# Patient Record
Sex: FEMALE | NOT HISPANIC OR LATINO | Employment: UNEMPLOYED | URBAN - NONMETROPOLITAN AREA
[De-identification: names, ages, dates, MRNs, and addresses within clinical notes are randomized per-mention and may not be internally consistent; named-entity substitution may affect disease eponyms.]

---

## 2017-04-19 ENCOUNTER — HOSPITAL ENCOUNTER (OUTPATIENT)
Dept: GENERAL RADIOLOGY | Facility: HOSPITAL | Age: 57
Discharge: HOME | End: 2017-04-19
Admitting: INTERNAL MEDICINE

## 2017-04-19 ENCOUNTER — OFFICE VISIT (OUTPATIENT)
Dept: FAMILY MEDICINE CLINIC | Facility: CLINIC | Age: 57
End: 2017-04-19

## 2017-04-19 ENCOUNTER — RESULTS ENCOUNTER (OUTPATIENT)
Dept: FAMILY MEDICINE CLINIC | Facility: CLINIC | Age: 57
End: 2017-04-19

## 2017-04-19 VITALS
OXYGEN SATURATION: 98 % | SYSTOLIC BLOOD PRESSURE: 95 MMHG | BODY MASS INDEX: 25.97 KG/M2 | DIASTOLIC BLOOD PRESSURE: 65 MMHG | RESPIRATION RATE: 15 BRPM | HEART RATE: 79 BPM | WEIGHT: 133 LBS | TEMPERATURE: 97.7 F

## 2017-04-19 DIAGNOSIS — E55.9 VITAMIN D DEFICIENCY: ICD-10-CM

## 2017-04-19 DIAGNOSIS — R73.01 IMPAIRED FASTING GLUCOSE: ICD-10-CM

## 2017-04-19 DIAGNOSIS — M54.50 LUMBOSACRAL PAIN: ICD-10-CM

## 2017-04-19 DIAGNOSIS — E53.8 VITAMIN B12 DEFICIENCY: ICD-10-CM

## 2017-04-19 DIAGNOSIS — E78.00 PURE HYPERCHOLESTEROLEMIA: Primary | ICD-10-CM

## 2017-04-19 DIAGNOSIS — E03.8 ADULT ONSET HYPOTHYROIDISM: ICD-10-CM

## 2017-04-19 DIAGNOSIS — E78.00 PURE HYPERCHOLESTEROLEMIA: ICD-10-CM

## 2017-04-19 PROCEDURE — 99214 OFFICE O/P EST MOD 30 MIN: CPT | Performed by: INTERNAL MEDICINE

## 2017-04-19 PROCEDURE — 72100 X-RAY EXAM L-S SPINE 2/3 VWS: CPT

## 2017-04-19 RX ORDER — PANTOPRAZOLE SODIUM 40 MG/1
TABLET, DELAYED RELEASE ORAL DAILY
COMMUNITY
Start: 2016-02-15 | End: 2017-04-19

## 2017-04-19 RX ORDER — CYCLOBENZAPRINE HCL 5 MG
TABLET ORAL
COMMUNITY
Start: 2015-07-16 | End: 2017-04-19

## 2017-04-19 NOTE — PROGRESS NOTES
Subjective   Lizeth Garza is a 56 y.o. female.     Chief Complaint   Patient presents with   • Back Pain   • Hip Pain   • Leg Pain   • Numbness       History of Present Illness   Patient is here to follow-up on the cholesterol and is due for lab work.  She is also due for labs for her sugar.  She would like to get her labwork for her thyroid at the clinic here.  She does not want to go back to [place] to see the endocrinologist due to the drive ,Patient complains of worsening lower back pain that radiates to her right hip and leg. She states that the pain is worse when she lies down.  She states at times she has numbness down her right leg.  She does not want any muscle relaxant as it did not help her  The last time 2 years ago    The following portions of the patient's history were reviewed and updated as appropriate: allergies, current medications, past family history, past medical history, past social history, past surgical history and problem list.    Review of Systems   Constitutional: Negative for appetite change, fatigue and fever.   HENT: Negative for congestion, ear discharge, ear pain, sinus pressure and sore throat.    Eyes: Negative for pain and discharge.   Respiratory: Negative for cough, chest tightness, shortness of breath and wheezing.    Cardiovascular: Negative for chest pain, palpitations and leg swelling.   Gastrointestinal: Negative for abdominal pain, blood in stool, constipation, diarrhea and nausea.   Endocrine: Negative for cold intolerance and heat intolerance.   Genitourinary: Negative for dysuria, flank pain and frequency.   Musculoskeletal: Positive for arthralgias and back pain. Negative for joint swelling.   Skin: Negative for color change.   Allergic/Immunologic: Negative for environmental allergies and food allergies.   Neurological: Positive for numbness. Negative for dizziness, weakness and headaches.   Hematological: Negative for adenopathy. Does not bruise/bleed easily.    Psychiatric/Behavioral: Negative for behavioral problems and dysphoric mood. The patient is not nervous/anxious.          Current Outpatient Prescriptions:   •  levothyroxine (SYNTHROID) 88 MCG tablet, Take 1 tablet by mouth Daily., Disp: 90 tablet, Rfl: 1    Current Facility-Administered Medications:   •  cyanocobalamin injection 1,000 mcg, 1,000 mcg, Intramuscular, Q28 Days, Cassandra Knapp MD, 1,000 mcg at 08/19/16 1449  •  cyanocobalamin injection 1,000 mcg, 1,000 mcg, Intramuscular, Q28 Days, Cassandra Knapp MD, 1,000 mcg at 07/28/16 1542  •  cyanocobalamin injection 1,000 mcg, 1,000 mcg, Intramuscular, Q28 Days, Cassandra Knapp MD, 1,000 mcg at 09/12/16 1546    Objective     Blood pressure 95/65, pulse 79, temperature 97.7 °F (36.5 °C), temperature source Oral, resp. rate 15, weight 133 lb (60.3 kg), SpO2 98 %.    Physical Exam   Constitutional: She is oriented to person, place, and time. She appears well-developed and well-nourished. No distress.   HENT:   Head: Normocephalic and atraumatic.   Right Ear: External ear normal.   Left Ear: External ear normal.   Nose: Nose normal.   Mouth/Throat: Oropharynx is clear and moist.   Eyes: Conjunctivae and EOM are normal. Pupils are equal, round, and reactive to light.   Neck: Normal range of motion. Neck supple. No thyromegaly present.   Cardiovascular: Normal rate, regular rhythm and normal heart sounds.    Pulmonary/Chest: Effort normal and breath sounds normal. No respiratory distress. She has no wheezes.   Abdominal: Soft. Bowel sounds are normal. She exhibits no distension. There is no tenderness. There is no guarding.   Musculoskeletal: Normal range of motion. She exhibits no edema.   Lymphadenopathy:     She has no cervical adenopathy.   Neurological: She is alert and oriented to person, place, and time.   No gross  motor or sensory deficits   Skin: Skin is warm. She is not diaphoretic. No erythema.   Psychiatric: She has a normal mood and affect.   Nursing note  and vitals reviewed.      Results for orders placed or performed in visit on 10/27/16   TSH   Result Value Ref Range    TSH 4.466 0.350 - 5.350 mIU/mL   T4, Free   Result Value Ref Range    Free T4 1.21 0.89 - 1.76 ng/dL   Comprehensive Metabolic Panel   Result Value Ref Range    Glucose 75 70 - 100 mg/dL    BUN 15 9 - 23 mg/dL    Creatinine 0.80 0.60 - 1.30 mg/dL    Sodium 146 132 - 146 mmol/L    Potassium 4.8 3.5 - 5.5 mmol/L    Chloride 105 99 - 109 mmol/L    CO2 31.0 20.0 - 31.0 mmol/L    Calcium 9.5 8.7 - 10.4 mg/dL    Total Protein 7.2 5.7 - 8.2 g/dL    Albumin 4.50 3.20 - 4.80 g/dL    ALT (SGPT) 23 7 - 40 U/L    AST (SGOT) 25 0 - 33 U/L    Alkaline Phosphatase 88 25 - 100 U/L    Total Bilirubin 0.4 0.3 - 1.2 mg/dL    eGFR Non African Amer 74 >60 mL/min/1.73    eGFR  African Amer 90 >60 mL/min/1.73    Globulin 2.7 gm/dL    A/G Ratio 1.7 g/dL    BUN/Creatinine Ratio 18.8 7.0 - 25.0    Anion Gap 10.0 3.0 - 11.0 mmol/L         Assessment/Plan   Lizeth was seen today for back pain, hip pain, leg pain and numbness.    Diagnoses and all orders for this visit:    Pure hypercholesterolemia  -     CBC & Differential; Future  -     Comprehensive Metabolic Panel; Future  -     Lipid Panel; Future    Impaired fasting glucose  -     Hemoglobin A1c; Future    Adult onset hypothyroidism    Lumbosacral pain  -     XR Spine Lumbar 2 or 3 View; Future  -     MRI Lumbar Spine Without Contrast; Future  -     Ambulatory Referral to Neurosurgery    Vitamin B12 deficiency  -     Vitamin B12; Future    Vitamin D deficiency  -     Vitamin D 25 Hydroxy; Future     plan:   1.  Mixed hyperlipidemia: We will obtain fasting CMP and lipid panel.  2.impaired fasting sugar: We will obtain fasting labs and hemoglobin A1c, diet and excise counseled.     3.hypothyroidism: We will continue current medication.  Obtain TSH  4.  Back pain: We will obtain x-ray MRI and refer patient to neurosurgery.  She will benefit from physical therapy , advised  when necessary NSAIDs             Cassandra Knapp MD

## 2017-05-02 ENCOUNTER — HOSPITAL ENCOUNTER (OUTPATIENT)
Dept: MRI IMAGING | Facility: HOSPITAL | Age: 57
Discharge: HOME | End: 2017-05-02
Admitting: INTERNAL MEDICINE

## 2017-05-02 DIAGNOSIS — M54.50 LUMBOSACRAL PAIN: ICD-10-CM

## 2017-05-02 PROCEDURE — 72148 MRI LUMBAR SPINE W/O DYE: CPT

## 2017-05-25 ENCOUNTER — OFFICE VISIT (OUTPATIENT)
Dept: FAMILY MEDICINE CLINIC | Facility: CLINIC | Age: 57
End: 2017-05-25

## 2017-05-25 VITALS
DIASTOLIC BLOOD PRESSURE: 43 MMHG | BODY MASS INDEX: 22.63 KG/M2 | HEART RATE: 92 BPM | SYSTOLIC BLOOD PRESSURE: 107 MMHG | RESPIRATION RATE: 16 BRPM | TEMPERATURE: 97.8 F | OXYGEN SATURATION: 100 % | WEIGHT: 136 LBS

## 2017-05-25 DIAGNOSIS — J01.01 ACUTE RECURRENT MAXILLARY SINUSITIS: ICD-10-CM

## 2017-05-25 DIAGNOSIS — J20.9 ACUTE BRONCHITIS WITH BRONCHOSPASM: ICD-10-CM

## 2017-05-25 DIAGNOSIS — H66.003 ACUTE SUPPURATIVE OTITIS MEDIA OF BOTH EARS WITHOUT SPONTANEOUS RUPTURE OF TYMPANIC MEMBRANES, RECURRENCE NOT SPECIFIED: Primary | ICD-10-CM

## 2017-05-25 PROCEDURE — 99213 OFFICE O/P EST LOW 20 MIN: CPT | Performed by: INTERNAL MEDICINE

## 2017-05-25 RX ORDER — CEFDINIR 300 MG/1
300 CAPSULE ORAL 2 TIMES DAILY
Qty: 20 CAPSULE | Refills: 0 | Status: SHIPPED | OUTPATIENT
Start: 2017-05-25 | End: 2017-06-04

## 2017-05-25 RX ORDER — METHYLPREDNISOLONE 4 MG/1
TABLET ORAL
Qty: 21 TABLET | Refills: 0 | Status: SHIPPED | OUTPATIENT
Start: 2017-05-25 | End: 2017-08-01

## 2017-06-05 ENCOUNTER — TELEPHONE (OUTPATIENT)
Dept: FAMILY MEDICINE CLINIC | Facility: CLINIC | Age: 57
End: 2017-06-05

## 2017-06-05 RX ORDER — DOXYCYCLINE 100 MG/1
100 CAPSULE ORAL 2 TIMES DAILY
Qty: 20 CAPSULE | Refills: 0 | Status: SHIPPED | OUTPATIENT
Start: 2017-06-05 | End: 2017-06-15

## 2017-06-05 NOTE — TELEPHONE ENCOUNTER
----- Message from Millie Barton sent at 6/5/2017  9:01 AM EDT -----  Contact: PATIENT  PATIENT IS REQUESTING MEDICINE TO [facility] FOR AN EAR INFECTION.

## 2017-07-27 ENCOUNTER — TELEPHONE (OUTPATIENT)
Dept: FAMILY MEDICINE CLINIC | Facility: CLINIC | Age: 57
End: 2017-07-27

## 2017-07-27 DIAGNOSIS — E03.9 ACQUIRED HYPOTHYROIDISM: ICD-10-CM

## 2017-07-27 DIAGNOSIS — E03.9 HYPOTHYROIDISM, ADULT: Primary | ICD-10-CM

## 2017-07-27 RX ORDER — LEVOTHYROXINE SODIUM 88 MCG
TABLET ORAL
Qty: 90 TABLET | Refills: 0 | OUTPATIENT
Start: 2017-07-27

## 2017-07-28 DIAGNOSIS — E03.9 ACQUIRED HYPOTHYROIDISM: ICD-10-CM

## 2017-07-28 LAB
25(OH)D3+25(OH)D2 SERPL-MCNC: 25.7 NG/ML
ALBUMIN SERPL-MCNC: 4.5 G/DL (ref 3.5–5)
ALBUMIN/GLOB SERPL: 1.8 G/DL (ref 1–2)
ALP SERPL-CCNC: 78 U/L (ref 38–126)
ALT SERPL-CCNC: 41 U/L (ref 13–69)
AST SERPL-CCNC: 30 U/L (ref 15–46)
BASOPHILS # BLD AUTO: 0.01 10*3/MM3 (ref 0–0.2)
BASOPHILS NFR BLD AUTO: 0.2 % (ref 0–2.5)
BILIRUB SERPL-MCNC: 0.9 MG/DL (ref 0.2–1.3)
BUN SERPL-MCNC: 15 MG/DL (ref 7–20)
BUN/CREAT SERPL: 18.8 (ref 7.1–23.5)
CALCIUM SERPL-MCNC: 9.8 MG/DL (ref 8.4–10.2)
CHLORIDE SERPL-SCNC: 101 MMOL/L (ref 98–107)
CHOLEST SERPL-MCNC: 187 MG/DL (ref 0–199)
CO2 SERPL-SCNC: 25 MMOL/L (ref 26–30)
CREAT SERPL-MCNC: 0.8 MG/DL (ref 0.6–1.3)
EOSINOPHIL # BLD AUTO: 0.09 10*3/MM3 (ref 0–0.7)
EOSINOPHIL NFR BLD AUTO: 1.9 % (ref 0–7)
ERYTHROCYTE [DISTWIDTH] IN BLOOD BY AUTOMATED COUNT: 13.9 % (ref 11.5–14.5)
GLOBULIN SER CALC-MCNC: 2.5 GM/DL
GLUCOSE SERPL-MCNC: 91 MG/DL (ref 74–98)
HBA1C MFR BLD: 5.8 %
HCT VFR BLD AUTO: 40.8 % (ref 37–47)
HDLC SERPL-MCNC: 62 MG/DL (ref 40–60)
HGB BLD-MCNC: 13.2 G/DL (ref 12–16)
IMM GRANULOCYTES # BLD: 0.02 10*3/MM3 (ref 0–0.06)
IMM GRANULOCYTES NFR BLD: 0.4 % (ref 0–0.6)
LDLC SERPL CALC-MCNC: 90 MG/DL (ref 0–99)
LYMPHOCYTES # BLD AUTO: 1.71 10*3/MM3 (ref 0.6–3.4)
LYMPHOCYTES NFR BLD AUTO: 35.3 % (ref 10–50)
MCH RBC QN AUTO: 28.3 PG (ref 27–31)
MCHC RBC AUTO-ENTMCNC: 32.4 G/DL (ref 30–37)
MCV RBC AUTO: 87.4 FL (ref 81–99)
MONOCYTES # BLD AUTO: 0.41 10*3/MM3 (ref 0–0.9)
MONOCYTES NFR BLD AUTO: 8.5 % (ref 0–12)
NEUTROPHILS # BLD AUTO: 2.6 10*3/MM3 (ref 2–6.9)
NEUTROPHILS NFR BLD AUTO: 53.7 % (ref 37–80)
NRBC BLD AUTO-RTO: 0 /100 WBC (ref 0–0)
PLATELET # BLD AUTO: 199 10*3/MM3 (ref 130–400)
POTASSIUM SERPL-SCNC: 4.6 MMOL/L (ref 3.5–5.1)
PROT SERPL-MCNC: 7 G/DL (ref 6.3–8.2)
RBC # BLD AUTO: 4.67 10*6/MM3 (ref 4.2–5.4)
SODIUM SERPL-SCNC: 139 MMOL/L (ref 137–145)
TRIGL SERPL-MCNC: 177 MG/DL
TSH SERPL DL<=0.005 MIU/L-ACNC: 0.29 MIU/ML (ref 0.47–4.68)
VIT B12 SERPL-MCNC: >1000 PG/ML (ref 239–931)
VLDLC SERPL CALC-MCNC: 35.4 MG/DL
WBC # BLD AUTO: 4.84 10*3/MM3 (ref 4.8–10.8)

## 2017-07-28 RX ORDER — ERGOCALCIFEROL 1.25 MG/1
50000 CAPSULE ORAL WEEKLY
Qty: 8 CAPSULE | Refills: 0 | Status: SHIPPED | OUTPATIENT
Start: 2017-07-28 | End: 2017-08-01 | Stop reason: SDUPTHER

## 2017-07-28 RX ORDER — LEVOTHYROXINE SODIUM 0.07 MG/1
75 TABLET ORAL DAILY
Qty: 30 TABLET | Refills: 0 | Status: SHIPPED | OUTPATIENT
Start: 2017-07-28 | End: 2017-08-01 | Stop reason: SDUPTHER

## 2017-08-01 ENCOUNTER — OFFICE VISIT (OUTPATIENT)
Dept: FAMILY MEDICINE CLINIC | Facility: CLINIC | Age: 57
End: 2017-08-01

## 2017-08-01 VITALS
TEMPERATURE: 98.3 F | OXYGEN SATURATION: 100 % | BODY MASS INDEX: 22.3 KG/M2 | DIASTOLIC BLOOD PRESSURE: 72 MMHG | RESPIRATION RATE: 16 BRPM | HEART RATE: 70 BPM | SYSTOLIC BLOOD PRESSURE: 112 MMHG | WEIGHT: 134 LBS

## 2017-08-01 DIAGNOSIS — E03.8 ADULT ONSET HYPOTHYROIDISM: ICD-10-CM

## 2017-08-01 DIAGNOSIS — E78.00 PURE HYPERCHOLESTEROLEMIA: Primary | ICD-10-CM

## 2017-08-01 DIAGNOSIS — R73.01 IMPAIRED FASTING GLUCOSE: ICD-10-CM

## 2017-08-01 DIAGNOSIS — M25.551 PAIN IN JOINT OF RIGHT HIP: ICD-10-CM

## 2017-08-01 DIAGNOSIS — E03.9 ACQUIRED HYPOTHYROIDISM: ICD-10-CM

## 2017-08-01 PROCEDURE — 96372 THER/PROPH/DIAG INJ SC/IM: CPT | Performed by: INTERNAL MEDICINE

## 2017-08-01 PROCEDURE — 99214 OFFICE O/P EST MOD 30 MIN: CPT | Performed by: INTERNAL MEDICINE

## 2017-08-01 RX ORDER — KETOROLAC TROMETHAMINE 30 MG/ML
30 INJECTION, SOLUTION INTRAMUSCULAR; INTRAVENOUS ONCE
Status: COMPLETED | OUTPATIENT
Start: 2017-08-01 | End: 2017-08-01

## 2017-08-01 RX ORDER — LEVOTHYROXINE SODIUM 0.07 MG/1
75 TABLET ORAL DAILY
Qty: 30 TABLET | Refills: 0 | Status: SHIPPED | OUTPATIENT
Start: 2017-08-01 | End: 2017-08-30 | Stop reason: SDUPTHER

## 2017-08-01 RX ORDER — ERGOCALCIFEROL 1.25 MG/1
50000 CAPSULE ORAL WEEKLY
Qty: 8 CAPSULE | Refills: 0 | Status: SHIPPED | OUTPATIENT
Start: 2017-08-01 | End: 2017-09-20

## 2017-08-01 RX ADMIN — KETOROLAC TROMETHAMINE 30 MG: 30 INJECTION, SOLUTION INTRAMUSCULAR; INTRAVENOUS at 09:39

## 2017-08-29 LAB — TSH SERPL DL<=0.005 MIU/L-ACNC: 1.4 MIU/ML (ref 0.47–4.68)

## 2017-08-30 DIAGNOSIS — E03.9 ACQUIRED HYPOTHYROIDISM: ICD-10-CM

## 2017-08-30 RX ORDER — LEVOTHYROXINE SODIUM 0.07 MG/1
75 TABLET ORAL DAILY
Qty: 90 TABLET | Refills: 1 | Status: SHIPPED | OUTPATIENT
Start: 2017-08-30 | End: 2017-11-14 | Stop reason: SDUPTHER

## 2017-09-01 RX ORDER — LEVOTHYROXINE SODIUM 0.07 MG/1
TABLET ORAL
Qty: 30 TABLET | Refills: 0 | Status: SHIPPED | OUTPATIENT
Start: 2017-09-01 | End: 2018-03-15 | Stop reason: SDUPTHER

## 2017-09-26 RX ORDER — ERGOCALCIFEROL 1.25 MG/1
CAPSULE ORAL
Qty: 8 CAPSULE | Refills: 0 | OUTPATIENT
Start: 2017-09-26

## 2017-10-04 ENCOUNTER — TELEPHONE (OUTPATIENT)
Dept: FAMILY MEDICINE CLINIC | Facility: CLINIC | Age: 57
End: 2017-10-04

## 2017-10-04 RX ORDER — AMOXICILLIN 500 MG/1
500 CAPSULE ORAL 3 TIMES DAILY
Qty: 30 CAPSULE | Refills: 0 | Status: SHIPPED | OUTPATIENT
Start: 2017-10-04 | End: 2017-10-14

## 2017-11-14 DIAGNOSIS — E03.9 ACQUIRED HYPOTHYROIDISM: ICD-10-CM

## 2017-11-14 RX ORDER — LEVOTHYROXINE SODIUM 0.07 MG/1
75 TABLET ORAL DAILY
Qty: 90 TABLET | Refills: 0 | Status: SHIPPED | OUTPATIENT
Start: 2017-11-14 | End: 2018-03-16 | Stop reason: SDUPTHER

## 2018-03-15 ENCOUNTER — TELEPHONE (OUTPATIENT)
Dept: INTERNAL MEDICINE | Facility: CLINIC | Age: 58
End: 2018-03-15

## 2018-03-15 ENCOUNTER — OFFICE VISIT (OUTPATIENT)
Dept: INTERNAL MEDICINE | Facility: CLINIC | Age: 58
End: 2018-03-15

## 2018-03-15 VITALS
WEIGHT: 135 LBS | DIASTOLIC BLOOD PRESSURE: 60 MMHG | HEART RATE: 108 BPM | BODY MASS INDEX: 26.5 KG/M2 | OXYGEN SATURATION: 96 % | HEIGHT: 60 IN | TEMPERATURE: 99.5 F | SYSTOLIC BLOOD PRESSURE: 102 MMHG | RESPIRATION RATE: 14 BRPM

## 2018-03-15 DIAGNOSIS — J20.8 ACUTE BRONCHITIS DUE TO OTHER SPECIFIED ORGANISMS: ICD-10-CM

## 2018-03-15 DIAGNOSIS — H66.003 ACUTE SUPPURATIVE OTITIS MEDIA OF BOTH EARS WITHOUT SPONTANEOUS RUPTURE OF TYMPANIC MEMBRANES, RECURRENCE NOT SPECIFIED: Primary | ICD-10-CM

## 2018-03-15 DIAGNOSIS — J01.01 ACUTE RECURRENT MAXILLARY SINUSITIS: ICD-10-CM

## 2018-03-15 PROCEDURE — 99213 OFFICE O/P EST LOW 20 MIN: CPT | Performed by: INTERNAL MEDICINE

## 2018-03-15 RX ORDER — CEPHALEXIN 500 MG/1
500 CAPSULE ORAL 3 TIMES DAILY
Qty: 30 CAPSULE | Refills: 0 | Status: SHIPPED | OUTPATIENT
Start: 2018-03-15 | End: 2018-03-25

## 2018-03-15 NOTE — PROGRESS NOTES
Subjective   Lizeth Garza is a 57 y.o. female.     Chief Complaint   Patient presents with   • Cough   • Chills   • Earache   • Fever       History of Present Illness   Patient is here complaining of sinus congestion, nasal discharge which started a few days ago. Patient   states now she has ear pain    which has been worsening since the past week. She tried over-the-counter medication with no improvement in symptoms ,she complains of cough with fever and chills    The following portions of the patient's history were reviewed and updated as appropriate: allergies, current medications, past family history, past medical history, past social history, past surgical history and problem list.    Review of Systems   Constitutional: Positive for chills and fever. Negative for appetite change and fatigue.   HENT: Positive for congestion and ear pain. Negative for ear discharge, sinus pressure and sore throat.    Eyes: Negative for pain and discharge.   Respiratory: Positive for cough. Negative for chest tightness, shortness of breath and wheezing.    Cardiovascular: Negative for chest pain, palpitations and leg swelling.   Gastrointestinal: Negative for abdominal pain, blood in stool, constipation, diarrhea and nausea.   Endocrine: Negative for cold intolerance and heat intolerance.   Genitourinary: Negative for dysuria, flank pain and frequency.   Musculoskeletal: Negative for back pain and joint swelling.   Skin: Negative for color change.   Allergic/Immunologic: Negative for environmental allergies and food allergies.   Neurological: Negative for dizziness, weakness, numbness and headaches.   Hematological: Negative for adenopathy. Does not bruise/bleed easily.   Psychiatric/Behavioral: Negative for behavioral problems and dysphoric mood. The patient is not nervous/anxious.          Current Outpatient Prescriptions:   •  levothyroxine (SYNTHROID, LEVOTHROID) 75 MCG tablet, Take 1 tablet by mouth Daily., Disp: 90  "tablet, Rfl: 0  •  cephalexin (KEFLEX) 500 MG capsule, Take 1 capsule by mouth 3 (Three) Times a Day for 10 days., Disp: 30 capsule, Rfl: 0    Current Facility-Administered Medications:   •  cyanocobalamin injection 1,000 mcg, 1,000 mcg, Intramuscular, Q28 Days, Cassandra Knapp MD, 1,000 mcg at 08/19/16 1449  •  cyanocobalamin injection 1,000 mcg, 1,000 mcg, Intramuscular, Q28 Days, Cassandra Knapp MD, 1,000 mcg at 07/28/16 1542  •  cyanocobalamin injection 1,000 mcg, 1,000 mcg, Intramuscular, Q28 Days, Cassandra Knapp MD, 1,000 mcg at 09/12/16 1546    Objective     Blood pressure 102/60, pulse 108, temperature 99.5 °F (37.5 °C), resp. rate 14, height 152.4 cm (60\"), weight 61.2 kg (135 lb), SpO2 96 %.    Physical Exam   Constitutional: She is oriented to person, place, and time. She appears well-developed and well-nourished. No distress.   HENT:   Head: Normocephalic and atraumatic.   Right Ear: External ear normal. Tympanic membrane is injected.   Left Ear: External ear normal. Tympanic membrane is injected.   Nose: Right sinus exhibits maxillary sinus tenderness. Left sinus exhibits maxillary sinus tenderness.   Mouth/Throat: Oropharyngeal exudate present.   Eyes: Conjunctivae and EOM are normal. Pupils are equal, round, and reactive to light.   Neck: Neck supple. No thyromegaly present.   Cardiovascular: Normal rate, regular rhythm and normal heart sounds.    Pulmonary/Chest: Effort normal and breath sounds normal. No respiratory distress.   Abdominal: Soft. Bowel sounds are normal. She exhibits no distension. There is no tenderness. There is no rebound.   Musculoskeletal: Normal range of motion. She exhibits no edema.   Lymphadenopathy:     She has no cervical adenopathy.   Neurological: She is alert and oriented to person, place, and time.   No gross motor or sensory deficits   Skin: Skin is warm. She is not diaphoretic.   Psychiatric: She has a normal mood and affect.   Nursing note and vitals " reviewed.      Results for orders placed or performed in visit on 07/27/17   TSH   Result Value Ref Range    TSH 1.400 0.470 - 4.680 mIU/mL         Assessment/Plan   Lizeth was seen today for cough, chills, earache and fever.    Diagnoses and all orders for this visit:    Acute suppurative otitis media of both ears without spontaneous rupture of tympanic membranes, recurrence not specified    Acute recurrent maxillary sinusitis    Acute bronchitis due to other specified organisms    Other orders  -     cephalexin (KEFLEX) 500 MG capsule; Take 1 capsule by mouth 3 (Three) Times a Day for 10 days.        plan:  1.  Acute bronchitis: We will start oral antibiotics  2. Acute suppurative otitis media   : start oral antibiotics  ,     3 . Acute maxillary sinusitis : We will start oral antibiotics         Cassandra Knapp MD

## 2018-03-16 DIAGNOSIS — E03.9 ACQUIRED HYPOTHYROIDISM: ICD-10-CM

## 2018-03-19 RX ORDER — LEVOTHYROXINE SODIUM 0.07 MG/1
75 TABLET ORAL DAILY
Qty: 90 TABLET | Refills: 0 | Status: SHIPPED | OUTPATIENT
Start: 2018-03-19 | End: 2018-03-30 | Stop reason: SDUPTHER

## 2018-03-29 DIAGNOSIS — E03.9 ACQUIRED HYPOTHYROIDISM: Primary | ICD-10-CM

## 2018-03-29 LAB — TSH SERPL DL<=0.005 MIU/L-ACNC: 1.06 MIU/ML (ref 0.47–4.68)

## 2018-03-30 DIAGNOSIS — E03.9 ACQUIRED HYPOTHYROIDISM: ICD-10-CM

## 2018-03-30 RX ORDER — LEVOTHYROXINE SODIUM 0.07 MG/1
75 TABLET ORAL DAILY
Qty: 90 TABLET | Refills: 2 | Status: SHIPPED | OUTPATIENT
Start: 2018-03-30

## 2018-04-02 ENCOUNTER — OFFICE VISIT (OUTPATIENT)
Dept: INTERNAL MEDICINE | Facility: CLINIC | Age: 58
End: 2018-04-02

## 2018-04-02 VITALS
HEART RATE: 75 BPM | HEIGHT: 60 IN | SYSTOLIC BLOOD PRESSURE: 107 MMHG | WEIGHT: 136 LBS | BODY MASS INDEX: 26.7 KG/M2 | RESPIRATION RATE: 16 BRPM | TEMPERATURE: 97.7 F | DIASTOLIC BLOOD PRESSURE: 48 MMHG | OXYGEN SATURATION: 100 %

## 2018-04-02 DIAGNOSIS — J30.89 CHRONIC NONSEASONAL ALLERGIC RHINITIS DUE TO POLLEN: ICD-10-CM

## 2018-04-02 DIAGNOSIS — H66.003 ACUTE SUPPURATIVE OTITIS MEDIA OF BOTH EARS WITHOUT SPONTANEOUS RUPTURE OF TYMPANIC MEMBRANES, RECURRENCE NOT SPECIFIED: ICD-10-CM

## 2018-04-02 DIAGNOSIS — R53.81 MALAISE AND FATIGUE: ICD-10-CM

## 2018-04-02 DIAGNOSIS — E55.9 VITAMIN D DEFICIENCY: ICD-10-CM

## 2018-04-02 DIAGNOSIS — R53.83 MALAISE AND FATIGUE: ICD-10-CM

## 2018-04-02 DIAGNOSIS — R10.11 RIGHT UPPER QUADRANT PAIN: ICD-10-CM

## 2018-04-02 DIAGNOSIS — E53.8 VITAMIN B12 DEFICIENCY: ICD-10-CM

## 2018-04-02 DIAGNOSIS — E03.8 ADULT ONSET HYPOTHYROIDISM: Primary | ICD-10-CM

## 2018-04-02 LAB
25(OH)D3+25(OH)D2 SERPL-MCNC: 23.6 NG/ML
ALBUMIN SERPL-MCNC: 4.7 G/DL (ref 3.5–5)
ALBUMIN/GLOB SERPL: 1.6 G/DL (ref 1–2)
ALP SERPL-CCNC: 91 U/L (ref 38–126)
ALT SERPL-CCNC: 46 U/L (ref 13–69)
AST SERPL-CCNC: 28 U/L (ref 15–46)
BASOPHILS # BLD AUTO: 0.02 10*3/MM3 (ref 0–0.2)
BASOPHILS NFR BLD AUTO: 0.3 % (ref 0–2.5)
BILIRUB SERPL-MCNC: 0.4 MG/DL (ref 0.2–1.3)
BUN SERPL-MCNC: 15 MG/DL (ref 7–20)
BUN/CREAT SERPL: 21.4 (ref 7.1–23.5)
CALCIUM SERPL-MCNC: 10.3 MG/DL (ref 8.4–10.2)
CHLORIDE SERPL-SCNC: 102 MMOL/L (ref 98–107)
CO2 SERPL-SCNC: 28 MMOL/L (ref 26–30)
CREAT SERPL-MCNC: 0.7 MG/DL (ref 0.6–1.3)
EOSINOPHIL # BLD AUTO: 0.12 10*3/MM3 (ref 0–0.7)
EOSINOPHIL NFR BLD AUTO: 1.7 % (ref 0–7)
ERYTHROCYTE [DISTWIDTH] IN BLOOD BY AUTOMATED COUNT: 13.1 % (ref 11.5–14.5)
GFR SERPLBLD CREATININE-BSD FMLA CKD-EPI: 104 ML/MIN/1.73
GFR SERPLBLD CREATININE-BSD FMLA CKD-EPI: 86 ML/MIN/1.73
GLOBULIN SER CALC-MCNC: 3 GM/DL
GLUCOSE SERPL-MCNC: 87 MG/DL (ref 74–98)
HCT VFR BLD AUTO: 42.1 % (ref 37–47)
HGB BLD-MCNC: 13.7 G/DL (ref 12–16)
IMM GRANULOCYTES # BLD: 0.01 10*3/MM3 (ref 0–0.06)
IMM GRANULOCYTES NFR BLD: 0.1 % (ref 0–0.6)
LYMPHOCYTES # BLD AUTO: 1.97 10*3/MM3 (ref 0.6–3.4)
LYMPHOCYTES NFR BLD AUTO: 28.3 % (ref 10–50)
MCH RBC QN AUTO: 28.3 PG (ref 27–31)
MCHC RBC AUTO-ENTMCNC: 32.5 G/DL (ref 30–37)
MCV RBC AUTO: 87 FL (ref 81–99)
MONOCYTES # BLD AUTO: 0.43 10*3/MM3 (ref 0–0.9)
MONOCYTES NFR BLD AUTO: 6.2 % (ref 0–12)
NEUTROPHILS # BLD AUTO: 4.42 10*3/MM3 (ref 2–6.9)
NEUTROPHILS NFR BLD AUTO: 63.4 % (ref 37–80)
NRBC BLD AUTO-RTO: 0 /100 WBC (ref 0–0)
PLATELET # BLD AUTO: 215 10*3/MM3 (ref 130–400)
POTASSIUM SERPL-SCNC: 4.1 MMOL/L (ref 3.5–5.1)
PROT SERPL-MCNC: 7.7 G/DL (ref 6.3–8.2)
RBC # BLD AUTO: 4.84 10*6/MM3 (ref 4.2–5.4)
SODIUM SERPL-SCNC: 144 MMOL/L (ref 137–145)
VIT B12 SERPL-MCNC: >1000 PG/ML (ref 239–931)
WBC # BLD AUTO: 6.97 10*3/MM3 (ref 4.8–10.8)

## 2018-04-02 PROCEDURE — 99214 OFFICE O/P EST MOD 30 MIN: CPT | Performed by: INTERNAL MEDICINE

## 2018-04-02 RX ORDER — CIPROFLOXACIN 500 MG/1
500 TABLET, FILM COATED ORAL 2 TIMES DAILY
Qty: 20 TABLET | Refills: 0 | Status: SHIPPED | OUTPATIENT
Start: 2018-04-02 | End: 2018-04-12

## 2018-04-02 RX ORDER — FLUTICASONE PROPIONATE 50 MCG
2 SPRAY, SUSPENSION (ML) NASAL DAILY
Qty: 1 BOTTLE | Refills: 6 | Status: SHIPPED | OUTPATIENT
Start: 2018-04-02

## 2018-04-02 NOTE — PROGRESS NOTES
Subjective   Lizeth Garza is a 57 y.o. female.     Chief Complaint   Patient presents with   • Earache     follow-up, patient states she is still having pain in her left ear   • sleeping issues     patient states she is sleeping more than normal and she is very tired   • Hypothyroidism   • Allergies   • Fatigue   • Abdominal Pain       History of Present Illness   Patient is here to follow-up on her thyroid.  She takes her medications as prescribed.  She complains of fatigue and she is very tired and she is sleeping a lot.  She also complains of allergy.  She complains of left ear pain which has been worsening.  She completed her keflex , She also complains of pain in the right upper quadrant which  Has been on and off with associated nausea    The following portions of the patient's history were reviewed and updated as appropriate: allergies, current medications, past family history, past medical history, past social history, past surgical history and problem list.    Review of Systems   Constitutional: Negative for appetite change, fatigue and fever.   HENT: Positive for ear pain. Negative for congestion, ear discharge, sinus pressure and sore throat.    Eyes: Negative for pain and discharge.   Respiratory: Negative for cough, chest tightness, shortness of breath and wheezing.    Cardiovascular: Negative for chest pain, palpitations and leg swelling.   Gastrointestinal: Positive for abdominal pain. Negative for blood in stool, constipation, diarrhea and nausea.        Ruq   Endocrine: Negative for cold intolerance and heat intolerance.   Genitourinary: Negative for dysuria, flank pain and frequency.   Musculoskeletal: Negative for back pain and joint swelling.   Skin: Negative for color change.   Allergic/Immunologic: Negative for environmental allergies and food allergies.   Neurological: Negative for dizziness, weakness, numbness and headaches.   Hematological: Negative for adenopathy. Does not  "bruise/bleed easily.   Psychiatric/Behavioral: Negative for behavioral problems and dysphoric mood. The patient is not nervous/anxious.          Current Outpatient Prescriptions:   •  levothyroxine (SYNTHROID, LEVOTHROID) 75 MCG tablet, Take 1 tablet by mouth Daily., Disp: 90 tablet, Rfl: 2  •  ciprofloxacin (CIPRO) 500 MG tablet, Take 1 tablet by mouth 2 (Two) Times a Day for 10 days., Disp: 20 tablet, Rfl: 0  •  fluticasone (FLONASE) 50 MCG/ACT nasal spray, 2 sprays into each nostril Daily., Disp: 1 bottle, Rfl: 6    Current Facility-Administered Medications:   •  cyanocobalamin injection 1,000 mcg, 1,000 mcg, Intramuscular, Q28 Days, Cassandra Knapp MD, 1,000 mcg at 08/19/16 1449  •  cyanocobalamin injection 1,000 mcg, 1,000 mcg, Intramuscular, Q28 Days, Cassandra Knapp MD, 1,000 mcg at 07/28/16 1542  •  cyanocobalamin injection 1,000 mcg, 1,000 mcg, Intramuscular, Q28 Days, Cassandra Knapp MD, 1,000 mcg at 09/12/16 1546    Objective     Blood pressure 107/48, pulse 75, temperature 97.7 °F (36.5 °C), temperature source Temporal Artery , resp. rate 16, height 152.4 cm (60\"), weight 61.7 kg (136 lb), SpO2 100 %.    Physical Exam   Constitutional: She is oriented to person, place, and time. She appears well-developed and well-nourished. No distress.   HENT:   Head: Normocephalic and atraumatic.   Right Ear: External ear normal.   Left Ear: External ear normal.   Nose: Nose normal.   Mouth/Throat: Oropharyngeal exudate present.   Eyes: Conjunctivae and EOM are normal. Pupils are equal, round, and reactive to light.   Neck: Neck supple. No thyromegaly present.   Cardiovascular: Normal rate, regular rhythm and normal heart sounds.    Pulmonary/Chest: Effort normal and breath sounds normal. No respiratory distress.   Abdominal: Soft. Bowel sounds are normal. She exhibits no distension. There is tenderness. There is no rebound.   ruq   Musculoskeletal: Normal range of motion. She exhibits no edema.   Lymphadenopathy:     She " has no cervical adenopathy.   Neurological: She is alert and oriented to person, place, and time.   No gross motor or sensory deficits   Skin: Skin is warm. She is not diaphoretic.   Psychiatric: She has a normal mood and affect.   Nursing note and vitals reviewed.      Results for orders placed or performed in visit on 03/29/18   TSH   Result Value Ref Range    TSH 1.060 0.470 - 4.680 mIU/mL         Assessment/Plan   Lizeth was seen today for earache, sleeping issues, hypothyroidism, allergies, fatigue and abdominal pain.    Diagnoses and all orders for this visit:    Adult onset hypothyroidism    Right upper quadrant pain  -     US Gallbladder  -     NM HIDA Scan With Pharmacological Intervention    Malaise and fatigue  -     CBC & Differential  -     Comprehensive Metabolic Panel    Chronic nonseasonal allergic rhinitis due to pollen    Acute suppurative otitis media of both ears without spontaneous rupture of tympanic membranes, recurrence not specified    Vitamin B12 deficiency  -     Vitamin B12    Vitamin D deficiency  -     Vitamin D 25 Hydroxy    Other orders  -     fluticasone (FLONASE) 50 MCG/ACT nasal spray; 2 sprays into each nostril Daily.  -     ciprofloxacin (CIPRO) 500 MG tablet; Take 1 tablet by mouth 2 (Two) Times a Day for 10 days.      Plan:  1.hypothyroidism: TSH reviewed, will continue current medication  2.right upper quadrant abdominal pain: We will obtain ultrasound and HIDA scan.  We will obtain labs  3.fatigue: We will obtain labs  4.acute suppurative otitis media: We will start oral antibiotics  5.allergic rhinitis: Will give a trial with Flonase and over-the-counter antihistamine           Cassandra Knapp MD

## 2018-04-03 ENCOUNTER — HOSPITAL ENCOUNTER (OUTPATIENT)
Dept: ULTRASOUND IMAGING | Facility: HOSPITAL | Age: 58
Discharge: HOME | End: 2018-04-03
Admitting: INTERNAL MEDICINE

## 2018-04-03 PROCEDURE — 76705 ECHO EXAM OF ABDOMEN: CPT

## 2018-04-03 RX ORDER — ERGOCALCIFEROL 1.25 MG/1
50000 CAPSULE ORAL WEEKLY
Qty: 8 CAPSULE | Refills: 0 | Status: SHIPPED | OUTPATIENT
Start: 2018-04-03 | End: 2018-10-02

## 2018-04-16 ENCOUNTER — APPOINTMENT (OUTPATIENT)
Dept: NUCLEAR MEDICINE | Facility: HOSPITAL | Age: 58
End: 2018-04-16

## 2018-08-23 ENCOUNTER — OFFICE VISIT (OUTPATIENT)
Dept: OBSTETRICS AND GYNECOLOGY | Facility: CLINIC | Age: 58
End: 2018-08-23

## 2018-08-23 VITALS
WEIGHT: 137.8 LBS | BODY MASS INDEX: 27.05 KG/M2 | HEIGHT: 60 IN | SYSTOLIC BLOOD PRESSURE: 98 MMHG | DIASTOLIC BLOOD PRESSURE: 60 MMHG

## 2018-08-23 DIAGNOSIS — N81.6 CYSTOCELE WITH RECTOCELE: ICD-10-CM

## 2018-08-23 DIAGNOSIS — N95.2 VAGINAL ATROPHY: ICD-10-CM

## 2018-08-23 DIAGNOSIS — N99.3 VAGINAL VAULT PROLAPSE AFTER HYSTERECTOMY: ICD-10-CM

## 2018-08-23 DIAGNOSIS — N81.10 CYSTOCELE WITH RECTOCELE: ICD-10-CM

## 2018-08-23 DIAGNOSIS — R10.2 PELVIC PRESSURE IN FEMALE: Primary | ICD-10-CM

## 2018-08-23 PROCEDURE — 99213 OFFICE O/P EST LOW 20 MIN: CPT | Performed by: PHYSICIAN ASSISTANT

## 2018-08-23 NOTE — PATIENT INSTRUCTIONS
Patient encouraged to begin kegels daily x 100  Advised to limit lifting or strenuous activity  Offered option of starting vaginal estrogen cream and she would like to try this  Follow up in 3 months or prn

## 2018-08-23 NOTE — PROGRESS NOTES
Subjective   Chief Complaint   Patient presents with   • Pelvic Pain     pelvic pain x 3 weeks       Lizeth Garza is a 57 y.o. year old  presenting to be seen for complaints of pelvic and vaginal pressure for about 3 weeks.  She had LAVH/BSO/A&P repair in .  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 She is occasionally constipated but not often     Past Medical History:   Diagnosis Date   • History of acute sinusitis         Current Outpatient Prescriptions:   •  fluticasone (FLONASE) 50 MCG/ACT nasal spray, 2 sprays into each nostril Daily., Disp: 1 bottle, Rfl: 6  •  levothyroxine (SYNTHROID, LEVOTHROID) 75 MCG tablet, Take 1 tablet by mouth Daily., Disp: 90 tablet, Rfl: 2  •  conjugated estrogens (PREMARIN) 0.625 MG/GM vaginal cream, Use 0.5 grams intravaginally 2 times per week to control symptoms., Disp: 30 g, Rfl: 3  •  vitamin D (ERGOCALCIFEROL) 97907 units capsule capsule, Take 1 capsule by mouth 1 (One) Time Per Week., Disp: 8 capsule, Rfl: 0    Current Facility-Administered Medications:   •  cyanocobalamin injection 1,000 mcg, 1,000 mcg, Intramuscular, Q28 Days, Cassandra Knapp MD, 1,000 mcg at 16 1449  •  cyanocobalamin injection 1,000 mcg, 1,000 mcg, Intramuscular, Q28 Days, Cassandra Knapp MD, 1,000 mcg at 16 1542  •  cyanocobalamin injection 1,000 mcg, 1,000 mcg, Intramuscular, Q28 Days, Cassandra Knapp MD, 1,000 mcg at 16 1546   Allergies   Allergen Reactions   • Amoxicillin Diarrhea   • Nsaids GI Intolerance     Stomach burning      Past Surgical History:   Procedure Laterality Date   • DILATION AND CURETTAGE, DIAGNOSTIC / THERAPEUTIC     • HYSTERECTOMY     • STRABISMUS SURGERY        Social History     Social History   • Marital status:      Spouse name:  "N/A   • Number of children: N/A   • Years of education: N/A     Occupational History   • Not on file.     Social History Main Topics   • Smoking status: Never Smoker   • Smokeless tobacco: Never Used   • Alcohol use No   • Drug use: No   • Sexual activity: Yes     Partners: Male     Birth control/ protection: Post-menopausal     Other Topics Concern   • Not on file     Social History Narrative   • No narrative on file      Family History   Problem Relation Age of Onset   • Diabetes Other    • Other Other         malaria:  AGE 75   • Heart attack Other    • Asthma Other    • Heart disease Other        Review of Systems   Constitutional: Negative.    Gastrointestinal: Positive for constipation.   Genitourinary: Positive for pelvic pain. Negative for difficulty urinating, dysuria and vaginal discharge.           Objective   BP 98/60   Ht 152.4 cm (60\")   Wt 62.5 kg (137 lb 12.8 oz)   Breastfeeding? No   BMI 26.91 kg/m²     Physical Exam   Constitutional: She appears well-developed and well-nourished. She is cooperative.   Abdominal: Soft. Normal appearance. There is no tenderness. There is no rigidity and no guarding.   Genitourinary: There is no tenderness or lesion on the right labia. There is no tenderness or lesion on the left labia. Right adnexum displays no mass and no tenderness. Left adnexum displays no mass and no tenderness. No tenderness in the vagina. No vaginal discharge found.   Genitourinary Comments: Vaginal tissue atrophic. There is 1 plus vaginal cuff prolapse and 1-2 plus cystocele and rectocele`with valsalva    Neurological: She is alert.   Skin: Skin is warm and dry.   Psychiatric: She has a normal mood and affect. Her behavior is normal.            Assessment and Plan  Lizeth was seen today for pelvic pain.    Diagnoses and all orders for this visit:    Pelvic pressure in female    Cystocele with rectocele    Vaginal vault prolapse after hysterectomy    Vaginal atrophy    Other " orders  -     conjugated estrogens (PREMARIN) 0.625 MG/GM vaginal cream; Use 0.5 grams intravaginally 2 times per week to control symptoms.      Patient Instructions   Patient encouraged to begin kegels daily x 100  Advised to limit lifting or strenuous activity  Offered option of starting vaginal estrogen cream and she would like to try this  Follow up in 3 months or prn             This note was electronically signed.    Beronica Melgar PA-C   August 23, 2018

## 2018-09-17 ENCOUNTER — OFFICE VISIT (OUTPATIENT)
Dept: INTERNAL MEDICINE | Facility: CLINIC | Age: 58
End: 2018-09-17

## 2018-09-17 VITALS
SYSTOLIC BLOOD PRESSURE: 100 MMHG | HEART RATE: 69 BPM | TEMPERATURE: 97.9 F | RESPIRATION RATE: 16 BRPM | BODY MASS INDEX: 26.85 KG/M2 | OXYGEN SATURATION: 100 % | WEIGHT: 137.5 LBS | DIASTOLIC BLOOD PRESSURE: 60 MMHG

## 2018-09-17 DIAGNOSIS — J06.0 ACUTE LARYNGOPHARYNGITIS: ICD-10-CM

## 2018-09-17 DIAGNOSIS — H66.003 ACUTE SUPPURATIVE OTITIS MEDIA OF BOTH EARS WITHOUT SPONTANEOUS RUPTURE OF TYMPANIC MEMBRANES, RECURRENCE NOT SPECIFIED: Primary | ICD-10-CM

## 2018-09-17 DIAGNOSIS — J01.01 ACUTE RECURRENT MAXILLARY SINUSITIS: ICD-10-CM

## 2018-09-17 PROCEDURE — 99213 OFFICE O/P EST LOW 20 MIN: CPT | Performed by: INTERNAL MEDICINE

## 2018-09-17 RX ORDER — AZITHROMYCIN 250 MG/1
TABLET, FILM COATED ORAL
Qty: 6 TABLET | Refills: 0 | Status: SHIPPED | OUTPATIENT
Start: 2018-09-17 | End: 2018-10-02

## 2018-09-17 NOTE — PROGRESS NOTES
Subjective   Lizeth Garza is a 57 y.o. female.     Chief Complaint   Patient presents with   • Hoarse   • Sore Throat   • Earache   • Nasal Congestion   • Cough       History of Present Illness   HPI: The patient   complains of ear pain , nasal discharge , sore throat  And sinus congestion since the past few days, patient has been trying over the counter medications with not much relief in her symptoms , patient also complains of cough and now has hoarseness of voice , she even tried saline gargles and it is not  Helping her      The following portions of the patient's history were reviewed and updated as appropriate: allergies, current medications, past family history, past medical history, past social history, past surgical history and problem list.    Review of Systems   Constitutional: Negative for appetite change, fatigue and fever.   HENT: Positive for congestion, ear pain, sore throat and voice change. Negative for ear discharge and sinus pressure.    Eyes: Negative for pain and discharge.   Respiratory: Positive for cough. Negative for chest tightness, shortness of breath and wheezing.    Cardiovascular: Negative for chest pain, palpitations and leg swelling.   Gastrointestinal: Negative for abdominal pain, blood in stool, constipation, diarrhea and nausea.   Endocrine: Negative for cold intolerance and heat intolerance.   Genitourinary: Negative for dysuria, flank pain and frequency.   Musculoskeletal: Negative for back pain and joint swelling.   Skin: Negative for color change.   Allergic/Immunologic: Negative for environmental allergies and food allergies.   Neurological: Negative for dizziness, weakness, numbness and headaches.   Hematological: Negative for adenopathy. Does not bruise/bleed easily.   Psychiatric/Behavioral: Negative for behavioral problems and dysphoric mood. The patient is not nervous/anxious.          Current Outpatient Prescriptions:   •  conjugated estrogens (PREMARIN) 0.625  MG/GM vaginal cream, Use 0.5 grams intravaginally 2 times per week to control symptoms., Disp: 30 g, Rfl: 3  •  fluticasone (FLONASE) 50 MCG/ACT nasal spray, 2 sprays into each nostril Daily., Disp: 1 bottle, Rfl: 6  •  levothyroxine (SYNTHROID, LEVOTHROID) 75 MCG tablet, Take 1 tablet by mouth Daily., Disp: 90 tablet, Rfl: 2  •  azithromycin (ZITHROMAX Z-BHARGAVI) 250 MG tablet, Take 2 tablets the first day, then 1 tablet daily for 4 days., Disp: 6 tablet, Rfl: 0  •  vitamin D (ERGOCALCIFEROL) 09069 units capsule capsule, Take 1 capsule by mouth 1 (One) Time Per Week., Disp: 8 capsule, Rfl: 0    Current Facility-Administered Medications:   •  cyanocobalamin injection 1,000 mcg, 1,000 mcg, Intramuscular, Q28 Days, Cassandra Knapp MD, 1,000 mcg at 08/19/16 1449  •  cyanocobalamin injection 1,000 mcg, 1,000 mcg, Intramuscular, Q28 Days, Cassandra Knapp MD, 1,000 mcg at 07/28/16 1542  •  cyanocobalamin injection 1,000 mcg, 1,000 mcg, Intramuscular, Q28 Days, Cassandra Knapp MD, 1,000 mcg at 09/12/16 1546    Objective     Blood pressure 100/60, pulse 69, temperature 97.9 °F (36.6 °C), temperature source Temporal Artery , resp. rate 16, weight 62.4 kg (137 lb 8 oz), SpO2 100 %, not currently breastfeeding.    Physical Exam   Constitutional: She is oriented to person, place, and time. She appears well-developed and well-nourished.   HENT:   Head: Normocephalic and atraumatic.   Right Ear: Tympanic membrane is erythematous.   Left Ear: Tympanic membrane is erythematous.   Nose: Rhinorrhea present. Right sinus exhibits maxillary sinus tenderness. Left sinus exhibits maxillary sinus tenderness.   Mouth/Throat: Oropharyngeal exudate present.   Eyes: Pupils are equal, round, and reactive to light. Conjunctivae and EOM are normal.   Neck: Normal range of motion. Neck supple.   Cardiovascular: Normal rate, regular rhythm and normal heart sounds.    Pulmonary/Chest: Effort normal and breath sounds normal.   Abdominal: Soft. Bowel sounds  are normal.   Musculoskeletal: Normal range of motion. She exhibits no edema, tenderness or deformity.   Neurological: She is alert and oriented to person, place, and time.   Skin: Skin is warm and dry.   Psychiatric: She has a normal mood and affect.       Results for orders placed or performed in visit on 04/02/18   Comprehensive Metabolic Panel   Result Value Ref Range    Glucose 87 74 - 98 mg/dL    BUN 15 7 - 20 mg/dL    Creatinine 0.70 0.60 - 1.30 mg/dL    eGFR Non African Am 86 >60 mL/min/1.73    eGFR African Am 104 >60 mL/min/1.73    BUN/Creatinine Ratio 21.4 7.1 - 23.5    Sodium 144 137 - 145 mmol/L    Potassium 4.1 3.5 - 5.1 mmol/L    Chloride 102 98 - 107 mmol/L    Total CO2 28.0 26.0 - 30.0 mmol/L    Calcium 10.3 (H) 8.4 - 10.2 mg/dL    Total Protein 7.7 6.3 - 8.2 g/dL    Albumin 4.70 3.50 - 5.00 g/dL    Globulin 3.0 gm/dL    A/G Ratio 1.6 1.0 - 2.0 g/dL    Total Bilirubin 0.4 0.2 - 1.3 mg/dL    Alkaline Phosphatase 91 38 - 126 U/L    AST (SGOT) 28 15 - 46 U/L    ALT (SGPT) 46 13 - 69 U/L   Vitamin B12   Result Value Ref Range    Vitamin B-12 >1000 (H) 239 - 931 pg/mL   Vitamin D 25 Hydroxy   Result Value Ref Range    25 Hydroxy, Vitamin D 23.6 ng/ml   CBC & Differential   Result Value Ref Range    WBC 6.97 4.80 - 10.80 10*3/mm3    RBC 4.84 4.20 - 5.40 10*6/mm3    Hemoglobin 13.7 12.0 - 16.0 g/dL    Hematocrit 42.1 37.0 - 47.0 %    MCV 87.0 81.0 - 99.0 fL    MCH 28.3 27.0 - 31.0 pg    MCHC 32.5 30.0 - 37.0 g/dL    RDW 13.1 11.5 - 14.5 %    Platelets 215 130 - 400 10*3/mm3    Neutrophil Rel % 63.4 37.0 - 80.0 %    Lymphocyte Rel % 28.3 10.0 - 50.0 %    Monocyte Rel % 6.2 0.0 - 12.0 %    Eosinophil Rel % 1.7 0.0 - 7.0 %    Basophil Rel % 0.3 0.0 - 2.5 %    Neutrophils Absolute 4.42 2.00 - 6.90 10*3/mm3    Lymphocytes Absolute 1.97 0.60 - 3.40 10*3/mm3    Monocytes Absolute 0.43 0.00 - 0.90 10*3/mm3    Eosinophils Absolute 0.12 0.00 - 0.70 10*3/mm3    Basophils Absolute 0.02 0.00 - 0.20 10*3/mm3    Immature  Granulocyte Rel % 0.1 0.0 - 0.6 %    Immature Grans Absolute 0.01 0.00 - 0.06 10*3/mm3    nRBC 0.0 0.0 - 0.0 /100 WBC         Assessment/Plan   Lizeth was seen today for hoarse, sore throat, earache, nasal congestion and cough.    Diagnoses and all orders for this visit:    Acute suppurative otitis media of both ears without spontaneous rupture of tympanic membranes, recurrence not specified    Acute recurrent maxillary sinusitis    Acute laryngopharyngitis    Other orders  -     azithromycin (ZITHROMAX Z-BHARGAVI) 250 MG tablet; Take 2 tablets the first day, then 1 tablet daily for 4 days.       Plan :   1. Acute suppurative otitis media: will   start oral antibiotics    2 .Acute maxillary sinusitis:   will   start oral antibiotics   3. Acute laryngopharyngitis:  will   Start  oral antibiotics             Cassandra Knapp MD

## 2018-09-27 ENCOUNTER — TELEPHONE (OUTPATIENT)
Dept: INTERNAL MEDICINE | Facility: CLINIC | Age: 58
End: 2018-09-27

## 2018-09-28 DIAGNOSIS — E03.9 HYPOTHYROIDISM, UNSPECIFIED TYPE: ICD-10-CM

## 2018-09-28 DIAGNOSIS — R73.01 IMPAIRED FASTING GLUCOSE: ICD-10-CM

## 2018-09-28 DIAGNOSIS — E78.2 MULTIPLE-TYPE HYPERLIPIDEMIA: Primary | ICD-10-CM

## 2018-09-28 DIAGNOSIS — E55.9 VITAMIN D DEFICIENCY: ICD-10-CM

## 2018-09-28 DIAGNOSIS — E53.8 COBALAMIN DEFICIENCY: ICD-10-CM

## 2018-10-01 LAB
25(OH)D3+25(OH)D2 SERPL-MCNC: 32.4 NG/ML
ALBUMIN SERPL-MCNC: 4.3 G/DL (ref 3.5–5)
ALBUMIN/GLOB SERPL: 1.6 G/DL (ref 1–2)
ALP SERPL-CCNC: 75 U/L (ref 38–126)
ALT SERPL-CCNC: 45 U/L (ref 13–69)
AST SERPL-CCNC: 36 U/L (ref 15–46)
BASOPHILS # BLD AUTO: 0.02 10*3/MM3 (ref 0–0.2)
BASOPHILS NFR BLD AUTO: 0.5 % (ref 0–2.5)
BILIRUB SERPL-MCNC: 0.8 MG/DL (ref 0.2–1.3)
BUN SERPL-MCNC: 13 MG/DL (ref 7–20)
BUN/CREAT SERPL: 18.6 (ref 7.1–23.5)
CALCIUM SERPL-MCNC: 9.8 MG/DL (ref 8.4–10.2)
CHLORIDE SERPL-SCNC: 106 MMOL/L (ref 98–107)
CHOLEST SERPL-MCNC: 208 MG/DL (ref 0–199)
CO2 SERPL-SCNC: 27 MMOL/L (ref 26–30)
CREAT SERPL-MCNC: 0.7 MG/DL (ref 0.6–1.3)
EOSINOPHIL # BLD AUTO: 0.17 10*3/MM3 (ref 0–0.7)
EOSINOPHIL NFR BLD AUTO: 4 % (ref 0–7)
ERYTHROCYTE [DISTWIDTH] IN BLOOD BY AUTOMATED COUNT: 13.5 % (ref 11.5–14.5)
GLOBULIN SER CALC-MCNC: 2.7 GM/DL
GLUCOSE SERPL-MCNC: 98 MG/DL (ref 74–98)
HBA1C MFR BLD: 6.3 %
HCT VFR BLD AUTO: 39 % (ref 37–47)
HDLC SERPL-MCNC: 73 MG/DL (ref 40–60)
HGB BLD-MCNC: 12.4 G/DL (ref 12–16)
IMM GRANULOCYTES # BLD: 0.01 10*3/MM3 (ref 0–0.06)
IMM GRANULOCYTES NFR BLD: 0.2 % (ref 0–0.6)
LDLC SERPL CALC-MCNC: 111 MG/DL (ref 0–99)
LYMPHOCYTES # BLD AUTO: 1.58 10*3/MM3 (ref 0.6–3.4)
LYMPHOCYTES NFR BLD AUTO: 37.4 % (ref 10–50)
MCH RBC QN AUTO: 27.5 PG (ref 27–31)
MCHC RBC AUTO-ENTMCNC: 31.8 G/DL (ref 30–37)
MCV RBC AUTO: 86.5 FL (ref 81–99)
MONOCYTES # BLD AUTO: 0.33 10*3/MM3 (ref 0–0.9)
MONOCYTES NFR BLD AUTO: 7.8 % (ref 0–12)
NEUTROPHILS # BLD AUTO: 2.12 10*3/MM3 (ref 2–6.9)
NEUTROPHILS NFR BLD AUTO: 50.1 % (ref 37–80)
NRBC BLD AUTO-RTO: 0 /100 WBC (ref 0–0)
PLATELET # BLD AUTO: 217 10*3/MM3 (ref 130–400)
POTASSIUM SERPL-SCNC: 4 MMOL/L (ref 3.5–5.1)
PROT SERPL-MCNC: 7 G/DL (ref 6.3–8.2)
RBC # BLD AUTO: 4.51 10*6/MM3 (ref 4.2–5.4)
SODIUM SERPL-SCNC: 140 MMOL/L (ref 137–145)
TRIGL SERPL-MCNC: 121 MG/DL
TSH SERPL DL<=0.005 MIU/L-ACNC: 0.56 MIU/ML (ref 0.47–4.68)
VIT B12 SERPL-MCNC: 777 PG/ML (ref 239–931)
VLDLC SERPL CALC-MCNC: 24.2 MG/DL
WBC # BLD AUTO: 4.23 10*3/MM3 (ref 4.8–10.8)

## 2018-10-02 ENCOUNTER — OFFICE VISIT (OUTPATIENT)
Dept: INTERNAL MEDICINE | Facility: CLINIC | Age: 58
End: 2018-10-02

## 2018-10-02 VITALS
SYSTOLIC BLOOD PRESSURE: 108 MMHG | TEMPERATURE: 98 F | OXYGEN SATURATION: 100 % | RESPIRATION RATE: 16 BRPM | DIASTOLIC BLOOD PRESSURE: 60 MMHG | WEIGHT: 137.75 LBS | BODY MASS INDEX: 26.9 KG/M2 | HEART RATE: 71 BPM

## 2018-10-02 DIAGNOSIS — R73.01 IMPAIRED FASTING GLUCOSE: ICD-10-CM

## 2018-10-02 DIAGNOSIS — R49.0 HOARSENESS: ICD-10-CM

## 2018-10-02 DIAGNOSIS — E03.8 ADULT ONSET HYPOTHYROIDISM: ICD-10-CM

## 2018-10-02 DIAGNOSIS — E78.2 MIXED HYPERLIPIDEMIA: Primary | ICD-10-CM

## 2018-10-02 PROCEDURE — 99214 OFFICE O/P EST MOD 30 MIN: CPT | Performed by: INTERNAL MEDICINE

## 2018-10-02 RX ORDER — FEXOFENADINE HYDROCHLORIDE 60 MG/1
60 TABLET, FILM COATED ORAL DAILY
COMMUNITY

## 2018-10-02 RX ORDER — OMEPRAZOLE 20 MG/1
20 CAPSULE, DELAYED RELEASE ORAL DAILY
COMMUNITY

## 2018-10-02 NOTE — PROGRESS NOTES
Subjective   Lizeth Garza is a 57 y.o. female.     Chief Complaint   Patient presents with   • Hypothyroidism   • Hyperlipidemia   • Blood Sugar Problem   • Hoarse       History of Present Illness   HPI:  . The patient is  here to follow up on the cholesterol and sugar  is trying to follow a diet. The patient is    Also here to follow up on thyroid and had lab work done .  She complains of hoarseness of voice for more than 6 months intermittently and worsening in the past month .   Hyperlipidemia   Pertinent negatives include no chest pain or shortness of breath.   Hypertension   Pertinent negatives include no chest pain, palpitations or shortness of breath.      The following portions of the patient's history were reviewed and updated as appropriate: allergies, current medications, past family history, past medical history, past social history, past surgical history and problem list.    Review of Systems   Constitutional: Negative for appetite change, fatigue and fever.   HENT: Positive for voice change. Negative for congestion, ear discharge, ear pain, sinus pressure and sore throat.    Eyes: Negative for pain and discharge.   Respiratory: Negative for cough, chest tightness, shortness of breath and wheezing.    Cardiovascular: Negative for chest pain, palpitations and leg swelling.   Gastrointestinal: Negative for abdominal pain, blood in stool, constipation, diarrhea and nausea.   Endocrine: Negative for cold intolerance and heat intolerance.   Genitourinary: Negative for dysuria, flank pain and frequency.   Musculoskeletal: Negative for back pain and joint swelling.   Skin: Negative for color change.   Allergic/Immunologic: Negative for environmental allergies and food allergies.   Neurological: Negative for dizziness, weakness, numbness and headaches.   Hematological: Negative for adenopathy. Does not bruise/bleed easily.   Psychiatric/Behavioral: Negative for behavioral problems and dysphoric mood. The  patient is not nervous/anxious.          Current Outpatient Prescriptions:   •  conjugated estrogens (PREMARIN) 0.625 MG/GM vaginal cream, Use 0.5 grams intravaginally 2 times per week to control symptoms., Disp: 30 g, Rfl: 3  •  fexofenadine (ALLEGRA) 60 MG tablet, Take 60 mg by mouth Daily., Disp: , Rfl:   •  fluticasone (FLONASE) 50 MCG/ACT nasal spray, 2 sprays into each nostril Daily., Disp: 1 bottle, Rfl: 6  •  levothyroxine (SYNTHROID, LEVOTHROID) 75 MCG tablet, Take 1 tablet by mouth Daily., Disp: 90 tablet, Rfl: 2  •  omeprazole (priLOSEC) 20 MG capsule, Take 20 mg by mouth Daily., Disp: , Rfl:     Current Facility-Administered Medications:   •  cyanocobalamin injection 1,000 mcg, 1,000 mcg, Intramuscular, Q28 Days, Cassandra Knapp MD, 1,000 mcg at 08/19/16 1449  •  cyanocobalamin injection 1,000 mcg, 1,000 mcg, Intramuscular, Q28 Days, Cassandra Knapp MD, 1,000 mcg at 07/28/16 1542  •  cyanocobalamin injection 1,000 mcg, 1,000 mcg, Intramuscular, Q28 Days, Cassandra Knapp MD, 1,000 mcg at 09/12/16 1546    Objective     Blood pressure 108/60, pulse 71, temperature 98 °F (36.7 °C), temperature source Temporal Artery , resp. rate 16, weight 62.5 kg (137 lb 12 oz), SpO2 100 %, not currently breastfeeding.    Physical Exam   Constitutional: She is oriented to person, place, and time. She appears well-developed and well-nourished. No distress.   HENT:   Head: Normocephalic and atraumatic.   Right Ear: External ear normal.   Left Ear: External ear normal.   Nose: Nose normal.   Mouth/Throat: Oropharyngeal exudate present.   Eyes: Pupils are equal, round, and reactive to light. Conjunctivae and EOM are normal.   Neck: Neck supple. No thyromegaly present.   Cardiovascular: Normal rate, regular rhythm and normal heart sounds.    Pulmonary/Chest: Effort normal and breath sounds normal. No respiratory distress.   Abdominal: Soft. Bowel sounds are normal. She exhibits no distension. There is no tenderness. There is no  rebound.   Musculoskeletal: Normal range of motion. She exhibits no edema.   Lymphadenopathy:     She has no cervical adenopathy.   Neurological: She is alert and oriented to person, place, and time.   No gross motor or sensory deficits   Skin: Skin is warm. She is not diaphoretic.   Psychiatric: She has a normal mood and affect.   Nursing note and vitals reviewed.      Results for orders placed or performed in visit on 09/28/18   Comprehensive Metabolic Panel   Result Value Ref Range    Glucose 98 74 - 98 mg/dL    BUN 13 7 - 20 mg/dL    Creatinine 0.70 0.60 - 1.30 mg/dL    eGFR Non African Am 86 >60 mL/min/1.73    eGFR African Am 104 >60 mL/min/1.73    BUN/Creatinine Ratio 18.6 7.1 - 23.5    Sodium 140 137 - 145 mmol/L    Potassium 4.0 3.5 - 5.1 mmol/L    Chloride 106 98 - 107 mmol/L    Total CO2 27.0 26.0 - 30.0 mmol/L    Calcium 9.8 8.4 - 10.2 mg/dL    Total Protein 7.0 6.3 - 8.2 g/dL    Albumin 4.30 3.50 - 5.00 g/dL    Globulin 2.7 gm/dL    A/G Ratio 1.6 1.0 - 2.0 g/dL    Total Bilirubin 0.8 0.2 - 1.3 mg/dL    Alkaline Phosphatase 75 38 - 126 U/L    AST (SGOT) 36 15 - 46 U/L    ALT (SGPT) 45 13 - 69 U/L   Lipid Panel   Result Value Ref Range    Total Cholesterol 208 (H) 0 - 199 mg/dL    Triglycerides 121 <150 mg/dL    HDL Cholesterol 73 (H) 40 - 60 mg/dL    VLDL Cholesterol 24.2 mg/dL    LDL Cholesterol  111 (H) 0 - 99 mg/dL   Hemoglobin A1c   Result Value Ref Range    Hemoglobin A1C 6.30 %   Vitamin B12   Result Value Ref Range    Vitamin B-12 777 239 - 931 pg/mL   Vitamin D 25 Hydroxy   Result Value Ref Range    25 Hydroxy, Vitamin D 32.4 ng/ml   TSH   Result Value Ref Range    TSH 0.555 0.470 - 4.680 mIU/mL   CBC & Differential   Result Value Ref Range    WBC 4.23 (L) 4.80 - 10.80 10*3/mm3    RBC 4.51 4.20 - 5.40 10*6/mm3    Hemoglobin 12.4 12.0 - 16.0 g/dL    Hematocrit 39.0 37.0 - 47.0 %    MCV 86.5 81.0 - 99.0 fL    MCH 27.5 27.0 - 31.0 pg    MCHC 31.8 30.0 - 37.0 g/dL    RDW 13.5 11.5 - 14.5 %     Platelets 217 130 - 400 10*3/mm3    Neutrophil Rel % 50.1 37.0 - 80.0 %    Lymphocyte Rel % 37.4 10.0 - 50.0 %    Monocyte Rel % 7.8 0.0 - 12.0 %    Eosinophil Rel % 4.0 0.0 - 7.0 %    Basophil Rel % 0.5 0.0 - 2.5 %    Neutrophils Absolute 2.12 2.00 - 6.90 10*3/mm3    Lymphocytes Absolute 1.58 0.60 - 3.40 10*3/mm3    Monocytes Absolute 0.33 0.00 - 0.90 10*3/mm3    Eosinophils Absolute 0.17 0.00 - 0.70 10*3/mm3    Basophils Absolute 0.02 0.00 - 0.20 10*3/mm3    Immature Granulocyte Rel % 0.2 0.0 - 0.6 %    Immature Grans Absolute 0.01 0.00 - 0.06 10*3/mm3    nRBC 0.0 0.0 - 0.0 /100 WBC         Assessment/Plan   Lizeth was seen today for hypothyroidism, hyperlipidemia, blood sugar problem and hoarse.    Diagnoses and all orders for this visit:    Mixed hyperlipidemia  -     CBC & Differential  -     Comprehensive Metabolic Panel  -     Lipid Panel    Impaired fasting glucose  -     Hemoglobin A1c    Adult onset hypothyroidism  -     TSH    Hoarseness  -     Ambulatory Referral to ENT (Otolaryngology)        Plan:  1.    mixed hyperlipidemia:  reviewed  fasting CMP and lipid panel.  Diet and excise counseled,   ldl 111  2. impaired glucose   :   reviewed  fasting CMP  and hba1c 6.3   , diet and exercise counseled  3. hypothyroidism:  reviewed  tsh , and continue levothyroxine  4. Hoarseness : refer to ent           Cassandra Knapp MD

## 2021-04-20 NOTE — PROGRESS NOTES
Subjective   Lizeth Garza is a 56 y.o. female.     Chief Complaint   Patient presents with   • Hip Pain   • Hypothyroidism   • Hyperlipidemia     Hpi:  Patient is here to follow-up on the cholesterol and thyroid and had lab work.  Patient complains of worsening  right hip  She states that the pain is worse when she lies down.    She does not want any muscle relaxant as it did not help her  The last time 2 years ago , she had xray of her hip and mri of her back , which have been reviewed ,she wants to follow up for her thyroid here and doesn't want to go back to endocrinology at this time  Hip Pain      Hypothyroidism   Associated symptoms include arthralgias. Pertinent negatives include no abdominal pain, chest pain, congestion, coughing, fatigue, fever, headaches, joint swelling, nausea, sore throat or weakness.   Hyperlipidemia   Exacerbating diseases include hypothyroidism. Pertinent negatives include no chest pain or shortness of breath.        The following portions of the patient's history were reviewed and updated as appropriate: allergies, current medications, past family history, past medical history, past social history, past surgical history and problem list.    Review of Systems   Constitutional: Negative for appetite change, fatigue and fever.   HENT: Negative for congestion, ear discharge, ear pain, sinus pressure and sore throat.    Eyes: Negative for pain and discharge.   Respiratory: Negative for cough, chest tightness, shortness of breath and wheezing.    Cardiovascular: Negative for chest pain, palpitations and leg swelling.   Gastrointestinal: Negative for abdominal pain, blood in stool, constipation, diarrhea and nausea.   Endocrine: Negative for cold intolerance and heat intolerance.   Genitourinary: Negative for dysuria, flank pain and frequency.   Musculoskeletal: Positive for arthralgias. Negative for joint swelling.   Skin: Negative for color change.   Allergic/Immunologic: Negative  for environmental allergies and food allergies.   Neurological: Negative for dizziness, weakness and headaches.   Hematological: Negative for adenopathy. Does not bruise/bleed easily.   Psychiatric/Behavioral: Negative for behavioral problems and dysphoric mood. The patient is not nervous/anxious.          Current Outpatient Prescriptions:   •  levothyroxine (SYNTHROID, LEVOTHROID) 75 MCG tablet, Take 1 tablet by mouth Daily., Disp: 30 tablet, Rfl: 0  •  vitamin D (ERGOCALCIFEROL) 73829 UNITS capsule capsule, Take 1 capsule by mouth 1 (One) Time Per Week for 8 doses., Disp: 8 capsule, Rfl: 0    Current Facility-Administered Medications:   •  cyanocobalamin injection 1,000 mcg, 1,000 mcg, Intramuscular, Q28 Days, Cassandra Knapp MD, 1,000 mcg at 08/19/16 1449  •  cyanocobalamin injection 1,000 mcg, 1,000 mcg, Intramuscular, Q28 Days, Cassandra Knapp MD, 1,000 mcg at 07/28/16 1542  •  cyanocobalamin injection 1,000 mcg, 1,000 mcg, Intramuscular, Q28 Days, Cassandra Knapp MD, 1,000 mcg at 09/12/16 1546    Objective     Blood pressure 112/72, pulse 70, temperature 98.3 °F (36.8 °C), temperature source Oral, resp. rate 16, weight 134 lb (60.8 kg), SpO2 100 %.    Physical Exam   Constitutional: She is oriented to person, place, and time. She appears well-developed and well-nourished. No distress.   HENT:   Head: Normocephalic and atraumatic.   Right Ear: External ear normal.   Left Ear: External ear normal.   Nose: Nose normal.   Mouth/Throat: Oropharynx is clear and moist.   Eyes: Conjunctivae and EOM are normal. Pupils are equal, round, and reactive to light.   Neck: Normal range of motion. Neck supple. No thyromegaly present.   Cardiovascular: Normal rate, regular rhythm and normal heart sounds.    Pulmonary/Chest: Effort normal and breath sounds normal. No respiratory distress. She has no wheezes.   Abdominal: Soft. Bowel sounds are normal. She exhibits no distension. There is no tenderness. There is no rebound and no  guarding.   Musculoskeletal: She exhibits tenderness. She exhibits no edema.   Right hip area   Lymphadenopathy:     She has no cervical adenopathy.   Neurological: She is alert and oriented to person, place, and time.   No gross motor or sensory deficits   Skin: Skin is warm. She is not diaphoretic. No erythema.   Psychiatric: She has a normal mood and affect.   Nursing note and vitals reviewed.      Results for orders placed or performed in visit on 04/19/17   Comprehensive Metabolic Panel   Result Value Ref Range    Glucose 91 74 - 98 mg/dL    BUN 15 7 - 20 mg/dL    Creatinine 0.80 0.60 - 1.30 mg/dL    eGFR Non African Am 74 >60 mL/min/1.73    eGFR African Am 90 >60 mL/min/1.73    BUN/Creatinine Ratio 18.8 7.1 - 23.5    Sodium 139 137 - 145 mmol/L    Potassium 4.6 3.5 - 5.1 mmol/L    Chloride 101 98 - 107 mmol/L    Total CO2 25.0 (L) 26.0 - 30.0 mmol/L    Calcium 9.8 8.4 - 10.2 mg/dL    Total Protein 7.0 6.3 - 8.2 g/dL    Albumin 4.50 3.50 - 5.00 g/dL    Globulin 2.5 gm/dL    A/G Ratio 1.8 1.0 - 2.0 g/dL    Total Bilirubin 0.9 0.2 - 1.3 mg/dL    Alkaline Phosphatase 78 38 - 126 U/L    AST (SGOT) 30 15 - 46 U/L    ALT (SGPT) 41 13 - 69 U/L   Lipid Panel   Result Value Ref Range    Total Cholesterol 187 0 - 199 mg/dL    Triglycerides 177 (H) <150 mg/dL    HDL Cholesterol 62 (H) 40 - 60 mg/dL    VLDL Cholesterol 35.4 mg/dL    LDL Cholesterol  90 0 - 99 mg/dL   Hemoglobin A1c   Result Value Ref Range    Hemoglobin A1C 5.80 %   Vitamin B12   Result Value Ref Range    Vitamin B-12 >1000 (H) 239 - 931 pg/mL   Vitamin D 25 Hydroxy   Result Value Ref Range    25 Hydroxy, Vitamin D 25.7 ng/mL   TSH   Result Value Ref Range    TSH 0.287 (L) 0.470 - 4.680 mIU/mL   CBC & Differential   Result Value Ref Range    WBC 4.84 4.80 - 10.80 10*3/mm3    RBC 4.67 4.20 - 5.40 10*6/mm3    Hemoglobin 13.2 12.0 - 16.0 g/dL    Hematocrit 40.8 37.0 - 47.0 %    MCV 87.4 81.0 - 99.0 fL    MCH 28.3 27.0 - 31.0 pg    MCHC 32.4 30.0 - 37.0 g/dL     RDW 13.9 11.5 - 14.5 %    Platelets 199 130 - 400 10*3/mm3    Neutrophil Rel % 53.7 37.0 - 80.0 %    Lymphocyte Rel % 35.3 10.0 - 50.0 %    Monocyte Rel % 8.5 0.0 - 12.0 %    Eosinophil Rel % 1.9 0.0 - 7.0 %    Basophil Rel % 0.2 0.0 - 2.5 %    Neutrophils Absolute 2.60 2.00 - 6.90 10*3/mm3    Lymphocytes Absolute 1.71 0.60 - 3.40 10*3/mm3    Monocytes Absolute 0.41 0.00 - 0.90 10*3/mm3    Eosinophils Absolute 0.09 0.00 - 0.70 10*3/mm3    Basophils Absolute 0.01 0.00 - 0.20 10*3/mm3    Immature Granulocyte Rel % 0.4 0.0 - 0.6 %    Immature Grans Absolute 0.02 0.00 - 0.06 10*3/mm3    nRBC 0.0 0.0 - 0.0 /100 WBC         Assessment/Plan   Lizeth was seen today for hip pain, hypothyroidism and hyperlipidemia.    Diagnoses and all orders for this visit:    Pure hypercholesterolemia    Adult onset hypothyroidism    Impaired fasting glucose    Pain in joint of right hip  -     ketorolac (TORADOL) injection 30 mg; Inject 30 mg into the shoulder, thigh, or buttocks 1 (One) Time.  -     Ambulatory Referral to Physical Therapy    Acquired hypothyroidism  -     levothyroxine (SYNTHROID, LEVOTHROID) 75 MCG tablet; Take 1 tablet by mouth Daily.    Other orders  -     vitamin D (ERGOCALCIFEROL) 56443 UNITS capsule capsule; Take 1 capsule by mouth 1 (One) Time Per Week for 8 doses.       plan:   1.  Mixed hyperlipidemia:  Reviewed fasting CMP and lipid panel, ldl 90   2.impaired fasting sugar:  Reviewed fasting labs and hemoglobin A1c - 5.8 , diet and excise counseled.    3. Hypothyroid : tsh reviewed , will continue levothyroxine  4. Right hip pain : xray and mri reviewed , refer to physical therapy , advised when necessary NSAIDs , im toradol given in office today                Cassandra Knapp MD   No